# Patient Record
Sex: FEMALE | Race: WHITE | URBAN - METROPOLITAN AREA
[De-identification: names, ages, dates, MRNs, and addresses within clinical notes are randomized per-mention and may not be internally consistent; named-entity substitution may affect disease eponyms.]

---

## 2017-08-08 PROBLEM — R05.3 CHRONIC COUGHING: Status: ACTIVE | Noted: 2017-08-08

## 2018-08-13 PROCEDURE — 86256 FLUORESCENT ANTIBODY TITER: CPT | Performed by: FAMILY MEDICINE

## 2018-08-13 PROCEDURE — 36415 COLL VENOUS BLD VENIPUNCTURE: CPT | Performed by: FAMILY MEDICINE

## 2018-08-13 PROCEDURE — 82784 ASSAY IGA/IGD/IGG/IGM EACH: CPT | Performed by: FAMILY MEDICINE

## 2018-08-13 PROCEDURE — 83516 IMMUNOASSAY NONANTIBODY: CPT | Performed by: FAMILY MEDICINE

## 2018-08-13 PROCEDURE — 86003 ALLG SPEC IGE CRUDE XTRC EA: CPT | Performed by: FAMILY MEDICINE

## 2020-10-10 ENCOUNTER — HOSPITAL ENCOUNTER (OUTPATIENT)
Age: 25
Discharge: HOME | End: 2020-10-10
Attending: FAMILY MEDICINE
Payer: COMMERCIAL

## 2020-10-10 VITALS
SYSTOLIC BLOOD PRESSURE: 146 MMHG | HEIGHT: 68 IN | RESPIRATION RATE: 18 BRPM | BODY MASS INDEX: 42.44 KG/M2 | HEART RATE: 99 BPM | WEIGHT: 280 LBS | DIASTOLIC BLOOD PRESSURE: 83 MMHG | TEMPERATURE: 98 F | OXYGEN SATURATION: 99 %

## 2020-10-10 DIAGNOSIS — L03.211 CELLULITIS OF FACE: Primary | ICD-10-CM

## 2020-10-10 PROCEDURE — 99202 OFFICE O/P NEW SF 15 MIN: CPT | Performed by: FAMILY MEDICINE

## 2020-10-10 RX ORDER — CLINDAMYCIN HYDROCHLORIDE 300 MG/1
300 CAPSULE ORAL 3 TIMES DAILY
Qty: 30 CAPSULE | Refills: 0 | Status: SHIPPED | OUTPATIENT
Start: 2020-10-10 | End: 2020-10-20

## 2020-10-10 NOTE — ED PROVIDER NOTES
Patient Seen in: [facility] [place]      History   Patient presents with:  Earache    Stated Complaint: lt ear pain    HPI    Pt is a 25 yo with a 2 day h/o left ear pain and swelling of the lobe. No fevers. No cold sx.  No d/c o Conjunctiva/sclera: Conjunctivae normal.      Pupils: Pupils are equal, round, and reactive to light. Neck:      Musculoskeletal: Normal range of motion and neck supple. Musculoskeletal: Normal range of motion. Skin:     General: Skin is warm.       C

## 2021-06-25 NOTE — PROGRESS NOTES
Outpatient Maternal-Fetal Medicine Consultation    Dear Dr. Izaiah Cortez    Thank you for requesting ultrasound evaluation and maternal fetal medicine consultation on your patient Reji Gonzalez.   As you are aware she is a 26year old female  with a sin Psychiatric:         Mood and Affect: Mood normal.         Behavior: Behavior normal.         GLUCOSE 1HR OB   Date Value Ref Range Status   06/19/2021 265 (H) <=139 mg/dL Final     Comment:     ACOG recommends a threshold of 135 mg/dL in high risk ethni conceive. Due to its strong association with obesity in the general population, type 2 diabetes mellitus is one of the two most common medical complications of the obese .  The increased risk of type 2 diabetes is primarily related to defect and cardiac), and the risk may increase with increasing maternal weight. Level II ultrasound is advised for women with obesity. The risk of neural tube defects increased significantly with maternal weight.     The analysis found that overweight and modified to provide three meals and three snacks with fewer carbohydrates. She received instruction on self-monitoring of blood glucose. She will be testing her blood sugars at fasting and 2 hour postprandially.    Fasting blood glucose should be less earnestine Rudy  and her significant other, Destin Castro, had their questions answered to their satisfaction.    IMPRESSION:  · IUP at 32w0d  · Obesity  · Gestational diabetes  · Mild polyhydramnios    RECOMMENDATIONS:  · Continue care with Dr. Katherine Gaspar   · 11-20 lb weight g

## 2021-06-28 ENCOUNTER — HOSPITAL ENCOUNTER (OUTPATIENT)
Dept: PERINATAL CARE | Facility: HOSPITAL | Age: 26
Discharge: HOME | End: 2021-06-28
Attending: OBSTETRICS & GYNECOLOGY
Payer: COMMERCIAL

## 2021-06-28 VITALS
DIASTOLIC BLOOD PRESSURE: 84 MMHG | BODY MASS INDEX: 48 KG/M2 | SYSTOLIC BLOOD PRESSURE: 129 MMHG | WEIGHT: 293 LBS | HEART RATE: 116 BPM

## 2021-06-28 DIAGNOSIS — O99.212 OBESITY AFFECTING PREGNANCY IN SECOND TRIMESTER: Primary | ICD-10-CM

## 2021-06-28 DIAGNOSIS — O24.410 DIET CONTROLLED GESTATIONAL DIABETES MELLITUS (GDM) IN THIRD TRIMESTER: ICD-10-CM

## 2021-06-28 PROCEDURE — 76811 OB US DETAILED SNGL FETUS: CPT | Performed by: OBSTETRICS & GYNECOLOGY

## 2021-06-28 PROCEDURE — 99243 OFF/OP CNSLTJ NEW/EST LOW 30: CPT | Performed by: OBSTETRICS & GYNECOLOGY

## 2021-07-28 ENCOUNTER — HOSPITAL ENCOUNTER (OUTPATIENT)
Dept: PERINATAL CARE | Facility: HOSPITAL | Age: 26
Discharge: HOME | End: 2021-07-28
Attending: OBSTETRICS & GYNECOLOGY

## 2021-07-28 DIAGNOSIS — O24.410 DIET CONTROLLED GESTATIONAL DIABETES MELLITUS (GDM) IN THIRD TRIMESTER: ICD-10-CM

## 2021-07-28 DIAGNOSIS — O99.213 OBESITY AFFECTING PREGNANCY IN THIRD TRIMESTER: Primary | ICD-10-CM

## 2021-07-28 DIAGNOSIS — O99.213 OBESITY AFFECTING PREGNANCY IN THIRD TRIMESTER: ICD-10-CM

## 2021-07-28 DIAGNOSIS — O36.63X0 EXCESSIVE FETAL GROWTH AFFECTING MANAGEMENT OF PREGNANCY IN THIRD TRIMESTER, SINGLE OR UNSPECIFIED FETUS: ICD-10-CM

## 2021-07-28 PROCEDURE — 76819 FETAL BIOPHYS PROFIL W/O NST: CPT | Performed by: OBSTETRICS & GYNECOLOGY

## 2021-07-28 PROCEDURE — 99213 OFFICE O/P EST LOW 20 MIN: CPT | Performed by: OBSTETRICS & GYNECOLOGY

## 2021-07-28 PROCEDURE — 76816 OB US FOLLOW-UP PER FETUS: CPT | Performed by: OBSTETRICS & GYNECOLOGY

## 2021-07-28 NOTE — PROGRESS NOTES
Outpatient Maternal-Fetal Medicine Consultation    Dear Dr. Fofana Nurse    Thank you for requesting ultrasound evaluation and maternal fetal medicine consultation on your patient Evan García.   As you are aware she is a 26year old female  with a sin Palpations: Abdomen is soft. Tenderness: There is no abdominal tenderness. Neurological:      Mental Status: She is alert.    Psychiatric:         Mood and Affect: Mood normal.         Behavior: Behavior normal.         GLUCOSE 1HR OB   Date Value Re of macrosomia and related birth injury to her and her baby. We talked about the increased and associated risk of fetal hyperinsulinemia, jaundice, electrolyte imbalance, seizure activity, IUFD and adverse outcome.  We talked about her increased risk of havi understands that the ultrasound weight is an estimate and can have about a 20% error.          IMPRESSION:  · IUP at 36w5d   · Large for gestational age  · Obesity  · Gestational diabetes  · Mild polyhydramnios    RECOMMENDATIONS:  · Continue care with

## 2021-08-03 PROBLEM — O24.419 GESTATIONAL DIABETES MELLITUS (GDM), ANTEPARTUM, GESTATIONAL DIABETES METHOD OF CONTROL UNSPECIFIED: Status: ACTIVE | Noted: 2021-08-03

## 2021-08-03 PROBLEM — O24.419 GESTATIONAL DIABETES MELLITUS (GDM), ANTEPARTUM, GESTATIONAL DIABETES METHOD OF CONTROL UNSPECIFIED (HCC): Status: ACTIVE | Noted: 2021-08-03

## 2024-04-29 ENCOUNTER — HOSPITAL ENCOUNTER (OUTPATIENT)
Age: 29
Discharge: HOME | End: 2024-04-29
Payer: COMMERCIAL

## 2024-04-29 ENCOUNTER — APPOINTMENT (OUTPATIENT)
Dept: CT IMAGING | Age: 29
End: 2024-04-29
Attending: NURSE PRACTITIONER
Payer: COMMERCIAL

## 2024-04-29 VITALS
DIASTOLIC BLOOD PRESSURE: 94 MMHG | HEART RATE: 92 BPM | TEMPERATURE: 98 F | OXYGEN SATURATION: 96 % | RESPIRATION RATE: 18 BRPM | SYSTOLIC BLOOD PRESSURE: 150 MMHG

## 2024-04-29 DIAGNOSIS — S09.90XA INJURY OF HEAD, INITIAL ENCOUNTER: Primary | ICD-10-CM

## 2024-04-29 PROCEDURE — 70450 CT HEAD/BRAIN W/O DYE: CPT | Performed by: NURSE PRACTITIONER

## 2024-04-29 PROCEDURE — 99203 OFFICE O/P NEW LOW 30 MIN: CPT | Performed by: NURSE PRACTITIONER

## 2024-04-29 NOTE — DISCHARGE INSTRUCTIONS
Tylenol or Motrin as needed for pain.  Rest.  Follow-up with your primary care doctor.  For any worsening or concerning symptoms, go to the nearest emergency department for further evaluation.
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
## 2025-06-06 ENCOUNTER — HOSPITAL ENCOUNTER (EMERGENCY)
Facility: HOSPITAL | Age: 30
Discharge: HOME | End: 2025-06-07
Attending: EMERGENCY MEDICINE
Payer: MEDICAID

## 2025-06-06 DIAGNOSIS — R73.9 HYPERGLYCEMIA: Primary | ICD-10-CM

## 2025-06-06 LAB
B-HCG UR QL: NEGATIVE
B-HCG UR QL: NEGATIVE
GLUCOSE BLDC GLUCOMTR-MCNC: 246 MG/DL (ref 70–99)

## 2025-06-06 PROCEDURE — 96374 THER/PROPH/DIAG INJ IV PUSH: CPT

## 2025-06-06 PROCEDURE — 82962 GLUCOSE BLOOD TEST: CPT

## 2025-06-06 PROCEDURE — 81025 URINE PREGNANCY TEST: CPT

## 2025-06-06 PROCEDURE — 99284 EMERGENCY DEPT VISIT MOD MDM: CPT

## 2025-06-06 PROCEDURE — 96375 TX/PRO/DX INJ NEW DRUG ADDON: CPT

## 2025-06-06 PROCEDURE — 96361 HYDRATE IV INFUSION ADD-ON: CPT

## 2025-06-06 RX ORDER — KETOROLAC TROMETHAMINE 15 MG/ML
15 INJECTION, SOLUTION INTRAMUSCULAR; INTRAVENOUS ONCE
Status: COMPLETED | OUTPATIENT
Start: 2025-06-06 | End: 2025-06-06

## 2025-06-06 RX ORDER — METOCLOPRAMIDE HYDROCHLORIDE 5 MG/ML
10 INJECTION INTRAMUSCULAR; INTRAVENOUS ONCE
Status: COMPLETED | OUTPATIENT
Start: 2025-06-06 | End: 2025-06-06

## 2025-06-06 RX ORDER — ACETAMINOPHEN 500 MG
1000 TABLET ORAL ONCE
Status: COMPLETED | OUTPATIENT
Start: 2025-06-06 | End: 2025-06-06

## 2025-06-07 VITALS
TEMPERATURE: 98 F | RESPIRATION RATE: 20 BRPM | HEART RATE: 95 BPM | SYSTOLIC BLOOD PRESSURE: 120 MMHG | OXYGEN SATURATION: 98 % | DIASTOLIC BLOOD PRESSURE: 68 MMHG

## 2025-06-07 NOTE — ED PROVIDER NOTES
Patient Seen in: [facility] Emergency Department    History     Chief Complaint   Patient presents with    Headache    Nausea       HPI    29-year-old female who over the last 2 weeks has noticed that she has had polydipsia, polyuria, slight \"zing\" sensation at her head but denies any kind of headache or vision changes or diplopia or any focal weakness or numbness or paresthesias.  No vision changes or diplopia.    History reviewed. Past Medical History[1]    History reviewed. Past Surgical History[2]      Medications :  Prescriptions Prior to Admission[3]     Family History[4]    Smoking Status: Social Hx on file[5]    Constitutional and vital signs reviewed.      Social History and Family History elements reviewed from today, pertinent positives to the presenting problem noted.    Physical Exam     ED Triage Vitals   BP 06/06/25 2253 146/80   Pulse 06/06/25 2253 104   Resp 06/06/25 2253 20   Temp 06/06/25 2254 98.4 °F (36.9 °C)   Temp src 06/06/25 2254 Oral   SpO2 06/06/25 2253 99 %   O2 Device 06/06/25 2253 None (Room air)       All measures to prevent infection transmission during my interaction with the patient were taken. The patient was already wearing a droplet mask on my arrival to the room. Personal protective equipment was worn throughout the duration of the exam.  Handwashing was performed prior to and after the exam.  Stethoscope and any equipment used during my examination was cleaned with super sani-cloth germicidal wipes following the exam.     Physical Exam    General: NAD  Head: Normocephalic and atraumatic.  Mouth/Throat/Ears/Nose: Oropharynx is clear and moist.   Eyes: Conjunctivae and EOM are normal.   Neck: Normal range of motion. Supple.   Cardiovascular: Normal rate, regular rhythm, normal heart sounds.  Respiratory/Chest: Clear and equal bilaterally. Exhibits no tenderness.  Gastrointestinal: Soft, non-tender, non-distended. Bowel sounds are normal.   Musculoskeletal:No swelling or  deformity.   Neurological: Alert and appropriate. No focal deficits. AOx4  CN II-XII grossly intact  5/5 strength: Left  strength, deltoid abduction, achilles, patella  5/5 strength: Right  strength, deltoid abduction, achilles, patella   SILT to bilateral upper and lower extremities   normal gait . Normal FTN, HTS, no dysdiadochokinesis.     Skin: Skin is warm and dry. No pallor.  Psychiatric: Has a normal mood and affect.      ED Course        Labs Reviewed   POCT GLUCOSE - Abnormal; Notable for the following components:       Result Value    POC Glucose  246 (*)     All other components within normal limits   POCT PREGNANCY URINE - Normal   POCT PREGNANCY URINE - Normal       As Interpreted by me    Imaging Results Available and Reviewed while in ED: No results found.  ED Medications Administered:   Medications   metoclopramide (Reglan) 5 mg/mL injection 10 mg (10 mg Intravenous Given 6/6/25 2341)   sodium chloride 0.9 % IV bolus 1,000 mL (0 mL Intravenous Stopped 6/7/25 0038)   acetaminophen (Tylenol Extra Strength) tab 1,000 mg (1,000 mg Oral Given 6/6/25 2340)   ketorolac (Toradol) 15 MG/ML injection 15 mg (15 mg Intravenous Given 6/6/25 2341)         MDM     Vitals:    06/06/25 2253 06/06/25 2254 06/06/25 2330 06/07/25 0000   BP: 146/80  124/72 120/68   Pulse: 104  97 95   Resp: 20      Temp:  98.4 °F (36.9 °C)     TempSrc:  Oral     SpO2: 99%  98% 98%     *I personally reviewed and interpreted all ED vitals.    Pulse Ox: 98%, Room air, Normal     Monitor Interpretation:   normal sinus rhythm as interpreted by me.  The cardiac monitor was ordered given concern for electrolyte derangements.      Medical Decision Making      Differential Diagnosis/ Diagnostic Considerations: Electrolyte derangements, hyperglycemia, diabetes    Complicating Factors: The patient already has DM to contribute to the complexity of this ED evaluation.    I reviewed prior chart records including office note from May 14, 2025.   Lab work notable for mild hyperglycemia, no clinical evidence of DKA.  Discussed optimizing her diabetic regimen, adding metformin and having the patient follow-up closely with PCP.   No neurologic deficits on exam.    Social determinants of health: Lost her insurance recently, access clinic referrals provided.    Dc In stable condition.  Patient is comfortable with the plan.    Prescriptions: As above      Disposition and Plan     Clinical Impression:  1. Hyperglycemia        Disposition:  Discharge    Follow-up:  Shavonne Pozo,   [address]  [facility] [id number] [phone]    Schedule an appointment as soon as possible for a visit in 1 day(s)      Center, [facility]  [address] [facility] [id number] [phone]    Schedule an appointment as soon as possible for a visit in 1 day(s)      [place], [facility]  [address]  [facility] [address] [phone]          Center, [facility]  [address]  [facility] [address] [phone]            Medications Prescribed:  Discharge Medication List as of 2025 12:39 AM        START taking these medications    Details   metFORMIN 500 MG Oral Tab Take 1 tablet (500 mg total) by mouth daily with breakfast., Normal, Disp-30 tablet, R-0                              [1]   Past Medical History:   BMI 40.0-44.9, adult (HCC)    Hyperlipidemia    Low vitamin D level    OBESITY   [2]   Past Surgical History:  Procedure Laterality Date          Remove tonsils/adenoids,<12 y/o  07    Performed by ESME SHARPE at [facility], [facility]    Tonsillectomy      adenoids   [3] (Not in a hospital admission)   [4]   Family History  Problem Relation Age of Onset    Lipids Mother     Diabetes Paternal Grandmother    [5]   Social History  Socioeconomic History    Marital status: Single   Tobacco Use    Smoking status: Never    Smokeless tobacco: Never    Vaping Use    Vaping status: Never Used   Substance and Sexual Activity    Alcohol use: No    Drug use: No    Sexual activity: Yes     Partners: Male

## 2025-06-07 NOTE — ED INITIAL ASSESSMENT (HPI)
Pt states that she is titrating off sertraline under the direction of a physician and is feeling a \"zinging\" in her head and nausea. Pt has been completely off the meds since Sunday.